# Patient Record
Sex: FEMALE | Race: WHITE | NOT HISPANIC OR LATINO
[De-identification: names, ages, dates, MRNs, and addresses within clinical notes are randomized per-mention and may not be internally consistent; named-entity substitution may affect disease eponyms.]

---

## 2020-11-21 ENCOUNTER — TRANSCRIPTION ENCOUNTER (OUTPATIENT)
Age: 35
End: 2020-11-21

## 2020-11-22 ENCOUNTER — TRANSCRIPTION ENCOUNTER (OUTPATIENT)
Age: 35
End: 2020-11-22

## 2021-02-12 PROBLEM — Z00.00 ENCOUNTER FOR PREVENTIVE HEALTH EXAMINATION: Status: ACTIVE | Noted: 2021-02-12

## 2021-03-11 ENCOUNTER — APPOINTMENT (OUTPATIENT)
Dept: OBGYN | Facility: CLINIC | Age: 36
End: 2021-03-11

## 2021-05-06 ENCOUNTER — NON-APPOINTMENT (OUTPATIENT)
Age: 36
End: 2021-05-06

## 2021-05-06 ENCOUNTER — APPOINTMENT (OUTPATIENT)
Dept: OBGYN | Facility: CLINIC | Age: 36
End: 2021-05-06
Payer: COMMERCIAL

## 2021-05-06 DIAGNOSIS — Z34.82 ENCOUNTER FOR SUPERVISION OF OTHER NORMAL PREGNANCY, SECOND TRIMESTER: ICD-10-CM

## 2021-05-06 DIAGNOSIS — N92.6 IRREGULAR MENSTRUATION, UNSPECIFIED: ICD-10-CM

## 2021-05-06 PROCEDURE — 36415 COLL VENOUS BLD VENIPUNCTURE: CPT

## 2021-05-06 PROCEDURE — 81003 URINALYSIS AUTO W/O SCOPE: CPT | Mod: QW

## 2021-05-06 PROCEDURE — 0500F INITIAL PRENATAL CARE VISIT: CPT

## 2021-05-11 LAB
2ND TRIMESTER DATA: NORMAL
AFP PNL SERPL: NORMAL
AFP SERPL-ACNC: NORMAL
CLINICAL BIOCHEMIST REVIEW: NORMAL
NOTES NTD: NORMAL

## 2021-05-12 ENCOUNTER — ASOB RESULT (OUTPATIENT)
Age: 36
End: 2021-05-12

## 2021-05-12 ENCOUNTER — APPOINTMENT (OUTPATIENT)
Dept: ANTEPARTUM | Facility: CLINIC | Age: 36
End: 2021-05-12
Payer: COMMERCIAL

## 2021-05-12 PROCEDURE — 76817 TRANSVAGINAL US OBSTETRIC: CPT

## 2021-05-12 PROCEDURE — 99072 ADDL SUPL MATRL&STAF TM PHE: CPT

## 2021-05-12 PROCEDURE — 76811 OB US DETAILED SNGL FETUS: CPT

## 2021-05-17 ENCOUNTER — NON-APPOINTMENT (OUTPATIENT)
Age: 36
End: 2021-05-17

## 2021-06-04 ENCOUNTER — NON-APPOINTMENT (OUTPATIENT)
Age: 36
End: 2021-06-04

## 2021-06-04 ENCOUNTER — APPOINTMENT (OUTPATIENT)
Dept: OBGYN | Facility: CLINIC | Age: 36
End: 2021-06-04
Payer: COMMERCIAL

## 2021-06-04 DIAGNOSIS — Z34.82 ENCOUNTER FOR SUPERVISION OF OTHER NORMAL PREGNANCY, SECOND TRIMESTER: ICD-10-CM

## 2021-06-04 DIAGNOSIS — R82.71 BACTERIURIA: ICD-10-CM

## 2021-06-04 PROCEDURE — 0502F SUBSEQUENT PRENATAL CARE: CPT

## 2021-06-04 PROCEDURE — 81003 URINALYSIS AUTO W/O SCOPE: CPT | Mod: QW

## 2021-06-05 ENCOUNTER — TRANSCRIPTION ENCOUNTER (OUTPATIENT)
Age: 36
End: 2021-06-05

## 2021-07-21 ENCOUNTER — APPOINTMENT (OUTPATIENT)
Dept: ANTEPARTUM | Facility: CLINIC | Age: 36
End: 2021-07-21

## 2021-07-27 ENCOUNTER — NON-APPOINTMENT (OUTPATIENT)
Age: 36
End: 2021-07-27

## 2021-07-27 ENCOUNTER — MED ADMIN CHARGE (OUTPATIENT)
Age: 36
End: 2021-07-27

## 2021-07-27 ENCOUNTER — APPOINTMENT (OUTPATIENT)
Dept: OBGYN | Facility: CLINIC | Age: 36
End: 2021-07-27
Payer: COMMERCIAL

## 2021-07-27 DIAGNOSIS — Z23 ENCOUNTER FOR IMMUNIZATION: ICD-10-CM

## 2021-07-27 PROCEDURE — 0502F SUBSEQUENT PRENATAL CARE: CPT

## 2021-07-27 PROCEDURE — 90715 TDAP VACCINE 7 YRS/> IM: CPT

## 2021-07-27 PROCEDURE — 99072 ADDL SUPL MATRL&STAF TM PHE: CPT

## 2021-07-27 PROCEDURE — 90471 IMMUNIZATION ADMIN: CPT

## 2021-08-03 ENCOUNTER — APPOINTMENT (OUTPATIENT)
Dept: ANTEPARTUM | Facility: CLINIC | Age: 36
End: 2021-08-03
Payer: COMMERCIAL

## 2021-08-03 ENCOUNTER — ASOB RESULT (OUTPATIENT)
Age: 36
End: 2021-08-03

## 2021-08-03 PROCEDURE — 76819 FETAL BIOPHYS PROFIL W/O NST: CPT

## 2021-08-03 PROCEDURE — 76816 OB US FOLLOW-UP PER FETUS: CPT

## 2021-08-10 ENCOUNTER — APPOINTMENT (OUTPATIENT)
Dept: OBGYN | Facility: CLINIC | Age: 36
End: 2021-08-10
Payer: COMMERCIAL

## 2021-08-10 DIAGNOSIS — O09.522 SUPERVISION OF ELDERLY MULTIGRAVIDA, SECOND TRIMESTER: ICD-10-CM

## 2021-08-10 PROCEDURE — 0502F SUBSEQUENT PRENATAL CARE: CPT

## 2021-08-24 ENCOUNTER — APPOINTMENT (OUTPATIENT)
Dept: OBGYN | Facility: CLINIC | Age: 36
End: 2021-08-24
Payer: COMMERCIAL

## 2021-08-24 ENCOUNTER — NON-APPOINTMENT (OUTPATIENT)
Age: 36
End: 2021-08-24

## 2021-08-24 VITALS
DIASTOLIC BLOOD PRESSURE: 70 MMHG | HEART RATE: 69 BPM | WEIGHT: 145 LBS | HEIGHT: 71 IN | BODY MASS INDEX: 20.3 KG/M2 | SYSTOLIC BLOOD PRESSURE: 108 MMHG

## 2021-08-24 PROCEDURE — 81003 URINALYSIS AUTO W/O SCOPE: CPT | Mod: QW

## 2021-08-24 PROCEDURE — 0502F SUBSEQUENT PRENATAL CARE: CPT

## 2021-08-31 ENCOUNTER — APPOINTMENT (OUTPATIENT)
Dept: ANTEPARTUM | Facility: CLINIC | Age: 36
End: 2021-08-31
Payer: COMMERCIAL

## 2021-08-31 ENCOUNTER — ASOB RESULT (OUTPATIENT)
Age: 36
End: 2021-08-31

## 2021-08-31 PROCEDURE — 76816 OB US FOLLOW-UP PER FETUS: CPT

## 2021-08-31 PROCEDURE — 76819 FETAL BIOPHYS PROFIL W/O NST: CPT

## 2021-09-01 ENCOUNTER — NON-APPOINTMENT (OUTPATIENT)
Age: 36
End: 2021-09-01

## 2021-09-10 ENCOUNTER — APPOINTMENT (OUTPATIENT)
Dept: OBGYN | Facility: CLINIC | Age: 36
End: 2021-09-10
Payer: COMMERCIAL

## 2021-09-10 DIAGNOSIS — Z34.93 ENCOUNTER FOR SUPERVISION OF NORMAL PREGNANCY, UNSPECIFIED, THIRD TRIMESTER: ICD-10-CM

## 2021-09-10 PROCEDURE — 36415 COLL VENOUS BLD VENIPUNCTURE: CPT

## 2021-09-10 PROCEDURE — 81003 URINALYSIS AUTO W/O SCOPE: CPT | Mod: QW

## 2021-09-10 PROCEDURE — 0502F SUBSEQUENT PRENATAL CARE: CPT

## 2021-09-14 ENCOUNTER — ASOB RESULT (OUTPATIENT)
Age: 36
End: 2021-09-14

## 2021-09-14 ENCOUNTER — APPOINTMENT (OUTPATIENT)
Dept: OBGYN | Facility: CLINIC | Age: 36
End: 2021-09-14
Payer: COMMERCIAL

## 2021-09-14 ENCOUNTER — APPOINTMENT (OUTPATIENT)
Dept: ANTEPARTUM | Facility: CLINIC | Age: 36
End: 2021-09-14
Payer: COMMERCIAL

## 2021-09-14 DIAGNOSIS — Z34.93 ENCOUNTER FOR SUPERVISION OF NORMAL PREGNANCY, UNSPECIFIED, THIRD TRIMESTER: ICD-10-CM

## 2021-09-14 LAB
ABO + RH PNL BLD: NORMAL
B-HEM STREP SPEC QL CULT: ABNORMAL
HBV SURFACE AB SER QL: REACTIVE
HIV1+2 AB SPEC QL IA.RAPID: NONREACTIVE
T PALLIDUM AB SER QL IA: NEGATIVE

## 2021-09-14 PROCEDURE — 81003 URINALYSIS AUTO W/O SCOPE: CPT | Mod: QW

## 2021-09-14 PROCEDURE — 0502F SUBSEQUENT PRENATAL CARE: CPT

## 2021-09-14 PROCEDURE — 76816 OB US FOLLOW-UP PER FETUS: CPT

## 2021-09-14 PROCEDURE — 76819 FETAL BIOPHYS PROFIL W/O NST: CPT

## 2021-09-15 ENCOUNTER — OUTPATIENT (OUTPATIENT)
Dept: INPATIENT UNIT | Facility: HOSPITAL | Age: 36
LOS: 1 days | End: 2021-09-15

## 2021-09-15 ENCOUNTER — NON-APPOINTMENT (OUTPATIENT)
Age: 36
End: 2021-09-15

## 2021-09-16 ENCOUNTER — TRANSCRIPTION ENCOUNTER (OUTPATIENT)
Age: 36
End: 2021-09-16

## 2021-09-16 LAB
C TRACH RRNA SPEC QL NAA+PROBE: NOT DETECTED
N GONORRHOEA RRNA SPEC QL NAA+PROBE: NOT DETECTED
SOURCE AMPLIFICATION: NORMAL

## 2021-09-17 ENCOUNTER — NON-APPOINTMENT (OUTPATIENT)
Age: 36
End: 2021-09-17

## 2021-09-18 ENCOUNTER — OUTPATIENT (OUTPATIENT)
Dept: OUTPATIENT SERVICES | Facility: HOSPITAL | Age: 36
LOS: 1 days | End: 2021-09-18
Payer: COMMERCIAL

## 2021-09-18 DIAGNOSIS — Z3A.00 WEEKS OF GESTATION OF PREGNANCY NOT SPECIFIED: ICD-10-CM

## 2021-09-18 DIAGNOSIS — O26.899 OTHER SPECIFIED PREGNANCY RELATED CONDITIONS, UNSPECIFIED TRIMESTER: ICD-10-CM

## 2021-09-18 PROCEDURE — 99214 OFFICE O/P EST MOD 30 MIN: CPT

## 2021-09-19 ENCOUNTER — INPATIENT (INPATIENT)
Facility: HOSPITAL | Age: 36
LOS: 0 days | Discharge: HOME | End: 2021-09-20
Attending: OBSTETRICS & GYNECOLOGY | Admitting: OBSTETRICS & GYNECOLOGY
Payer: COMMERCIAL

## 2021-09-19 VITALS — WEIGHT: 147.93 LBS | HEIGHT: 71 IN

## 2021-09-19 DIAGNOSIS — Z3A.00 WEEKS OF GESTATION OF PREGNANCY NOT SPECIFIED: ICD-10-CM

## 2021-09-19 DIAGNOSIS — O26.899 OTHER SPECIFIED PREGNANCY RELATED CONDITIONS, UNSPECIFIED TRIMESTER: ICD-10-CM

## 2021-09-19 LAB
BASOPHILS # BLD AUTO: 0.02 K/UL — SIGNIFICANT CHANGE UP (ref 0–0.2)
BASOPHILS NFR BLD AUTO: 0.2 % — SIGNIFICANT CHANGE UP (ref 0–2)
BLD GP AB SCN SERPL QL: NEGATIVE — SIGNIFICANT CHANGE UP
COVID-19 SPIKE DOMAIN AB INTERP: POSITIVE
COVID-19 SPIKE DOMAIN ANTIBODY RESULT: >250 U/ML — HIGH
EOSINOPHIL # BLD AUTO: 0.04 K/UL — SIGNIFICANT CHANGE UP (ref 0–0.5)
EOSINOPHIL NFR BLD AUTO: 0.3 % — SIGNIFICANT CHANGE UP (ref 0–6)
HBV SURFACE AG SER-ACNC: SIGNIFICANT CHANGE UP
HCT VFR BLD CALC: 40.3 % — SIGNIFICANT CHANGE UP (ref 34.5–45)
HGB BLD-MCNC: 13.5 G/DL — SIGNIFICANT CHANGE UP (ref 11.5–15.5)
IMM GRANULOCYTES NFR BLD AUTO: 0.5 % — SIGNIFICANT CHANGE UP (ref 0–1.5)
LYMPHOCYTES # BLD AUTO: 1.81 K/UL — SIGNIFICANT CHANGE UP (ref 1–3.3)
LYMPHOCYTES # BLD AUTO: 15.3 % — SIGNIFICANT CHANGE UP (ref 13–44)
MCHC RBC-ENTMCNC: 31.7 PG — SIGNIFICANT CHANGE UP (ref 27–34)
MCHC RBC-ENTMCNC: 33.5 GM/DL — SIGNIFICANT CHANGE UP (ref 32–36)
MCV RBC AUTO: 94.6 FL — SIGNIFICANT CHANGE UP (ref 80–100)
MONOCYTES # BLD AUTO: 0.65 K/UL — SIGNIFICANT CHANGE UP (ref 0–0.9)
MONOCYTES NFR BLD AUTO: 5.5 % — SIGNIFICANT CHANGE UP (ref 2–14)
NEUTROPHILS # BLD AUTO: 9.27 K/UL — HIGH (ref 1.8–7.4)
NEUTROPHILS NFR BLD AUTO: 78.2 % — HIGH (ref 43–77)
NRBC # BLD: 0 /100 WBCS — SIGNIFICANT CHANGE UP (ref 0–0)
PLATELET # BLD AUTO: 236 K/UL — SIGNIFICANT CHANGE UP (ref 150–400)
RBC # BLD: 4.26 M/UL — SIGNIFICANT CHANGE UP (ref 3.8–5.2)
RBC # FLD: 12.6 % — SIGNIFICANT CHANGE UP (ref 10.3–14.5)
RH IG SCN BLD-IMP: POSITIVE — SIGNIFICANT CHANGE UP
RH IG SCN BLD-IMP: POSITIVE — SIGNIFICANT CHANGE UP
SARS-COV-2 IGG+IGM SERPL QL IA: >250 U/ML — HIGH
SARS-COV-2 IGG+IGM SERPL QL IA: POSITIVE
SARS-COV-2 RNA SPEC QL NAA+PROBE: SIGNIFICANT CHANGE UP
WBC # BLD: 11.85 K/UL — HIGH (ref 3.8–10.5)
WBC # FLD AUTO: 11.85 K/UL — HIGH (ref 3.8–10.5)

## 2021-09-19 PROCEDURE — 59400 OBSTETRICAL CARE: CPT

## 2021-09-19 RX ORDER — DIPHENHYDRAMINE HCL 50 MG
25 CAPSULE ORAL EVERY 6 HOURS
Refills: 0 | Status: DISCONTINUED | OUTPATIENT
Start: 2021-09-19 | End: 2021-09-20

## 2021-09-19 RX ORDER — AMPICILLIN TRIHYDRATE 250 MG
2 CAPSULE ORAL ONCE
Refills: 0 | Status: COMPLETED | OUTPATIENT
Start: 2021-09-19 | End: 2021-09-19

## 2021-09-19 RX ORDER — KETOROLAC TROMETHAMINE 30 MG/ML
30 SYRINGE (ML) INJECTION ONCE
Refills: 0 | Status: DISCONTINUED | OUTPATIENT
Start: 2021-09-19 | End: 2021-09-19

## 2021-09-19 RX ORDER — OXYTOCIN 10 UNIT/ML
333.33 VIAL (ML) INJECTION
Qty: 20 | Refills: 0 | Status: DISCONTINUED | OUTPATIENT
Start: 2021-09-19 | End: 2021-09-19

## 2021-09-19 RX ORDER — AER TRAVELER 0.5 G/1
1 SOLUTION RECTAL; TOPICAL EVERY 4 HOURS
Refills: 0 | Status: DISCONTINUED | OUTPATIENT
Start: 2021-09-19 | End: 2021-09-20

## 2021-09-19 RX ORDER — IBUPROFEN 200 MG
600 TABLET ORAL EVERY 6 HOURS
Refills: 0 | Status: DISCONTINUED | OUTPATIENT
Start: 2021-09-19 | End: 2021-09-20

## 2021-09-19 RX ORDER — HYDROCORTISONE 1 %
1 OINTMENT (GRAM) TOPICAL EVERY 6 HOURS
Refills: 0 | Status: DISCONTINUED | OUTPATIENT
Start: 2021-09-19 | End: 2021-09-20

## 2021-09-19 RX ORDER — IBUPROFEN 200 MG
600 TABLET ORAL EVERY 6 HOURS
Refills: 0 | Status: COMPLETED | OUTPATIENT
Start: 2021-09-19 | End: 2022-08-18

## 2021-09-19 RX ORDER — CITRIC ACID/SODIUM CITRATE 300-500 MG
15 SOLUTION, ORAL ORAL EVERY 6 HOURS
Refills: 0 | Status: DISCONTINUED | OUTPATIENT
Start: 2021-09-19 | End: 2021-09-19

## 2021-09-19 RX ORDER — SIMETHICONE 80 MG/1
80 TABLET, CHEWABLE ORAL EVERY 4 HOURS
Refills: 0 | Status: DISCONTINUED | OUTPATIENT
Start: 2021-09-19 | End: 2021-09-20

## 2021-09-19 RX ORDER — TETANUS TOXOID, REDUCED DIPHTHERIA TOXOID AND ACELLULAR PERTUSSIS VACCINE, ADSORBED 5; 2.5; 8; 8; 2.5 [IU]/.5ML; [IU]/.5ML; UG/.5ML; UG/.5ML; UG/.5ML
0.5 SUSPENSION INTRAMUSCULAR ONCE
Refills: 0 | Status: DISCONTINUED | OUTPATIENT
Start: 2021-09-19 | End: 2021-09-20

## 2021-09-19 RX ORDER — ACETAMINOPHEN 500 MG
975 TABLET ORAL
Refills: 0 | Status: DISCONTINUED | OUTPATIENT
Start: 2021-09-19 | End: 2021-09-20

## 2021-09-19 RX ORDER — OXYCODONE HYDROCHLORIDE 5 MG/1
5 TABLET ORAL ONCE
Refills: 0 | Status: DISCONTINUED | OUTPATIENT
Start: 2021-09-19 | End: 2021-09-20

## 2021-09-19 RX ORDER — BENZOCAINE 10 %
1 GEL (GRAM) MUCOUS MEMBRANE EVERY 6 HOURS
Refills: 0 | Status: DISCONTINUED | OUTPATIENT
Start: 2021-09-19 | End: 2021-09-20

## 2021-09-19 RX ORDER — SODIUM CHLORIDE 9 MG/ML
1000 INJECTION, SOLUTION INTRAVENOUS
Refills: 0 | Status: DISCONTINUED | OUTPATIENT
Start: 2021-09-19 | End: 2021-09-19

## 2021-09-19 RX ORDER — DIBUCAINE 1 %
1 OINTMENT (GRAM) RECTAL EVERY 6 HOURS
Refills: 0 | Status: DISCONTINUED | OUTPATIENT
Start: 2021-09-19 | End: 2021-09-20

## 2021-09-19 RX ORDER — MAGNESIUM HYDROXIDE 400 MG/1
30 TABLET, CHEWABLE ORAL
Refills: 0 | Status: DISCONTINUED | OUTPATIENT
Start: 2021-09-19 | End: 2021-09-20

## 2021-09-19 RX ORDER — PRAMOXINE HYDROCHLORIDE 150 MG/15G
1 AEROSOL, FOAM RECTAL EVERY 4 HOURS
Refills: 0 | Status: DISCONTINUED | OUTPATIENT
Start: 2021-09-19 | End: 2021-09-20

## 2021-09-19 RX ORDER — OXYTOCIN 10 UNIT/ML
VIAL (ML) INJECTION
Qty: 30 | Refills: 0 | Status: DISCONTINUED | OUTPATIENT
Start: 2021-09-19 | End: 2021-09-19

## 2021-09-19 RX ORDER — SODIUM CHLORIDE 9 MG/ML
3 INJECTION INTRAMUSCULAR; INTRAVENOUS; SUBCUTANEOUS EVERY 8 HOURS
Refills: 0 | Status: DISCONTINUED | OUTPATIENT
Start: 2021-09-19 | End: 2021-09-20

## 2021-09-19 RX ORDER — FENTANYL/BUPIVACAINE/NS/PF 2MCG/ML-.1
250 PLASTIC BAG, INJECTION (ML) INJECTION
Refills: 0 | Status: DISCONTINUED | OUTPATIENT
Start: 2021-09-19 | End: 2021-09-19

## 2021-09-19 RX ORDER — LANOLIN
1 OINTMENT (GRAM) TOPICAL EVERY 6 HOURS
Refills: 0 | Status: DISCONTINUED | OUTPATIENT
Start: 2021-09-19 | End: 2021-09-20

## 2021-09-19 RX ORDER — OXYCODONE HYDROCHLORIDE 5 MG/1
5 TABLET ORAL
Refills: 0 | Status: DISCONTINUED | OUTPATIENT
Start: 2021-09-19 | End: 2021-09-20

## 2021-09-19 RX ORDER — INFLUENZA VIRUS VACCINE 15; 15; 15; 15 UG/.5ML; UG/.5ML; UG/.5ML; UG/.5ML
0.5 SUSPENSION INTRAMUSCULAR ONCE
Refills: 0 | Status: DISCONTINUED | OUTPATIENT
Start: 2021-09-19 | End: 2021-09-19

## 2021-09-19 RX ORDER — OXYTOCIN 10 UNIT/ML
333.33 VIAL (ML) INJECTION
Qty: 20 | Refills: 0 | Status: DISCONTINUED | OUTPATIENT
Start: 2021-09-19 | End: 2021-09-20

## 2021-09-19 RX ORDER — CEFAZOLIN SODIUM 1 G
2000 VIAL (EA) INJECTION EVERY 8 HOURS
Refills: 0 | Status: DISCONTINUED | OUTPATIENT
Start: 2021-09-19 | End: 2021-09-19

## 2021-09-19 RX ORDER — AMPICILLIN TRIHYDRATE 250 MG
1 CAPSULE ORAL EVERY 4 HOURS
Refills: 0 | Status: DISCONTINUED | OUTPATIENT
Start: 2021-09-19 | End: 2021-09-19

## 2021-09-19 RX ADMIN — Medication 1000 MILLIUNIT(S)/MIN: at 13:29

## 2021-09-19 RX ADMIN — Medication 600 MILLIGRAM(S): at 19:10

## 2021-09-19 RX ADMIN — Medication 250 MILLILITER(S): at 06:01

## 2021-09-19 RX ADMIN — Medication 975 MILLIGRAM(S): at 20:24

## 2021-09-19 RX ADMIN — Medication 975 MILLIGRAM(S): at 21:25

## 2021-09-19 RX ADMIN — Medication 600 MILLIGRAM(S): at 23:58

## 2021-09-19 RX ADMIN — Medication 30 MILLIGRAM(S): at 13:30

## 2021-09-19 RX ADMIN — Medication 100 MILLIGRAM(S): at 09:45

## 2021-09-19 RX ADMIN — Medication 600 MILLIGRAM(S): at 20:03

## 2021-09-19 RX ADMIN — SODIUM CHLORIDE 125 MILLILITER(S): 9 INJECTION, SOLUTION INTRAVENOUS at 05:01

## 2021-09-19 RX ADMIN — Medication 2 MILLIUNIT(S)/MIN: at 09:18

## 2021-09-19 RX ADMIN — Medication 216 GRAM(S): at 05:45

## 2021-09-19 NOTE — PATIENT PROFILE OB - TEACHING/LEARNING FACTORS IMPACT ABILITY TO LEARN
[Resistant to using DME in place] : resistant to using DME in place [3 or More Times Per Week] : exercises 3 or more times per week [Walking] : walking [PPS Score: ____] : Palliative Performance Scale (PPS) Score: [unfilled] [FAST Score: ____] : Functional Assessment Scale (FAST) Score: [unfilled] none

## 2021-09-20 ENCOUNTER — TRANSCRIPTION ENCOUNTER (OUTPATIENT)
Age: 36
End: 2021-09-20

## 2021-09-20 ENCOUNTER — APPOINTMENT (OUTPATIENT)
Dept: OBGYN | Facility: CLINIC | Age: 36
End: 2021-09-20

## 2021-09-20 VITALS
HEART RATE: 53 BPM | TEMPERATURE: 98 F | SYSTOLIC BLOOD PRESSURE: 123 MMHG | DIASTOLIC BLOOD PRESSURE: 73 MMHG | RESPIRATION RATE: 18 BRPM | OXYGEN SATURATION: 98 %

## 2021-09-20 LAB
RUBV IGG SER-ACNC: 1.1 INDEX — SIGNIFICANT CHANGE UP
RUBV IGG SER-IMP: POSITIVE — SIGNIFICANT CHANGE UP
T PALLIDUM AB TITR SER: NEGATIVE — SIGNIFICANT CHANGE UP

## 2021-09-20 PROCEDURE — 36415 COLL VENOUS BLD VENIPUNCTURE: CPT

## 2021-09-20 PROCEDURE — 86850 RBC ANTIBODY SCREEN: CPT

## 2021-09-20 PROCEDURE — 86901 BLOOD TYPING SEROLOGIC RH(D): CPT

## 2021-09-20 PROCEDURE — 85025 COMPLETE CBC W/AUTO DIFF WBC: CPT

## 2021-09-20 PROCEDURE — 87635 SARS-COV-2 COVID-19 AMP PRB: CPT

## 2021-09-20 PROCEDURE — 87340 HEPATITIS B SURFACE AG IA: CPT

## 2021-09-20 PROCEDURE — 86900 BLOOD TYPING SEROLOGIC ABO: CPT

## 2021-09-20 PROCEDURE — 99214 OFFICE O/P EST MOD 30 MIN: CPT

## 2021-09-20 PROCEDURE — 86762 RUBELLA ANTIBODY: CPT

## 2021-09-20 PROCEDURE — 86769 SARS-COV-2 COVID-19 ANTIBODY: CPT

## 2021-09-20 PROCEDURE — 86780 TREPONEMA PALLIDUM: CPT

## 2021-09-20 PROCEDURE — 59050 FETAL MONITOR W/REPORT: CPT

## 2021-09-20 PROCEDURE — 59025 FETAL NON-STRESS TEST: CPT

## 2021-09-20 RX ORDER — ACETAMINOPHEN 500 MG
2 TABLET ORAL
Qty: 56 | Refills: 0
Start: 2021-09-20 | End: 2021-09-26

## 2021-09-20 RX ORDER — IBUPROFEN 200 MG
1 TABLET ORAL
Qty: 28 | Refills: 0
Start: 2021-09-20 | End: 2021-09-26

## 2021-09-20 RX ORDER — IBUPROFEN 200 MG
1 TABLET ORAL
Qty: 0 | Refills: 0 | DISCHARGE
Start: 2021-09-20

## 2021-09-20 RX ORDER — ACETAMINOPHEN 500 MG
3 TABLET ORAL
Qty: 0 | Refills: 0 | DISCHARGE
Start: 2021-09-20

## 2021-09-20 RX ADMIN — Medication 600 MILLIGRAM(S): at 11:44

## 2021-09-20 RX ADMIN — Medication 975 MILLIGRAM(S): at 17:03

## 2021-09-20 RX ADMIN — Medication 600 MILLIGRAM(S): at 00:55

## 2021-09-20 RX ADMIN — Medication 975 MILLIGRAM(S): at 10:05

## 2021-09-20 RX ADMIN — Medication 975 MILLIGRAM(S): at 02:53

## 2021-09-20 RX ADMIN — SODIUM CHLORIDE 3 MILLILITER(S): 9 INJECTION INTRAMUSCULAR; INTRAVENOUS; SUBCUTANEOUS at 06:54

## 2021-09-20 RX ADMIN — Medication 975 MILLIGRAM(S): at 03:50

## 2021-09-20 RX ADMIN — Medication 975 MILLIGRAM(S): at 09:35

## 2021-09-20 RX ADMIN — Medication 600 MILLIGRAM(S): at 18:44

## 2021-09-20 RX ADMIN — Medication 600 MILLIGRAM(S): at 19:15

## 2021-09-20 RX ADMIN — Medication 600 MILLIGRAM(S): at 12:44

## 2021-09-20 RX ADMIN — Medication 975 MILLIGRAM(S): at 17:33

## 2021-09-20 RX ADMIN — Medication 600 MILLIGRAM(S): at 06:51

## 2021-09-20 RX ADMIN — SODIUM CHLORIDE 3 MILLILITER(S): 9 INJECTION INTRAMUSCULAR; INTRAVENOUS; SUBCUTANEOUS at 00:46

## 2021-09-20 RX ADMIN — Medication 1 TABLET(S): at 11:39

## 2021-09-20 RX ADMIN — SODIUM CHLORIDE 3 MILLILITER(S): 9 INJECTION INTRAMUSCULAR; INTRAVENOUS; SUBCUTANEOUS at 11:42

## 2021-09-20 NOTE — LACTATION INITIAL EVALUATION - NS LACT CON REASON FOR REQ
Met with dyad at about 13 hours of life s/p  @ 38.6 wks gestation. Pt is now P2 and stated that she  her [age] year old for one year. She denied any significant medical history. RN had assisted pt with latching infant prior to this visit. Infant was observed to have a deep latch via the cradle hold and eventually developed and organized, rhythmic suck. Pt denied pain when infant had a deep latch. Per pt and RN pt had expressible colostrum bilaterally and pt stated she was comfortable with hand expression. Reviewed breastfeeding education including the milk production feedback system, responsive/cue based feedings, infant feeding cues, feeding frequency, benefits of skin to skin, how to determine the infant is transferring enough at the breast and cluster feeding. Referred pt to the infant feeding menu at the bedside and the "Your Guide To Postpartum [facility]" booklet. Answered all questions. Informed about LC availability. Pt and support person verbalized understanding of all information discussed./multiparous mom

## 2021-09-20 NOTE — DISCHARGE NOTE OB - CARE PLAN
1 Principal Discharge DX:	Vaginal delivery  Assessment and plan of treatment:	Vaginal delivery, meeting all postpartum milestones.  Follow up in 6 weeks.

## 2021-09-20 NOTE — DISCHARGE NOTE OB - CARE PROVIDER_API CALL
Snehal Butler  Obstetrics and Gynecology  [address] [address]  Phone: [phone]  Fax: [phone]  Follow Up Time:

## 2021-09-20 NOTE — PROGRESS NOTE ADULT - SUBJECTIVE AND OBJECTIVE BOX
Patient evaluated at bedside this morning, resting comfortable in bed, no acute events overnight.  She reports pain is well controlled with tylenol and motrin  She denies headache, dizziness, chest pain, palpitations, shortness of breath, nausea, vomiting, heavy vaginal bleeding or perineal discomfort. Reports decrease in amount of vaginal bleeding and denies clots.  She has been ambulating without assistance, voiding spontaneously, and is breastfeeding.   Tolerating food well, without nausea/vomit.  Passing flatus.     Physical Exam:  T(C): 36.8 (09-20-21 @ 06:00), Max: 36.8 (09-20-21 @ 06:00)  HR: 53 (09-20-21 @ 06:00) (53 - 64)  BP: 96/61 (09-20-21 @ 06:00) (96/60 - 96/61)  RR: 18 (09-20-21 @ 06:00) (18 - 18)  SpO2: 96% (09-20-21 @ 06:00) (96% - 99%)    GA: NAD, A&O x 3  Pulm: normal respiratory rate  Abd: + BS, soft, nontender, nondistended, no rebound or guarding, uterus firm at midline and 2  fb below umbilicus  Extremities: no swelling or calf tenderness                          13.5   11.85 )-----------( 236      ( 19 Sep 2021 05:35 )             40.3           acetaminophen   Tablet .. 975 milliGRAM(s) Oral <User Schedule>  benzocaine 20%/menthol 0.5% Spray 1 Spray(s) Topical every 6 hours PRN  dibucaine 1% Ointment 1 Application(s) Topical every 6 hours PRN  diphenhydrAMINE 25 milliGRAM(s) Oral every 6 hours PRN  diphtheria/tetanus/pertussis (acellular) Vaccine (ADAcel) 0.5 milliLiter(s) IntraMuscular once  hydrocortisone 1% Cream 1 Application(s) Topical every 6 hours PRN  ibuprofen  Tablet. 600 milliGRAM(s) Oral every 6 hours  lanolin Ointment 1 Application(s) Topical every 6 hours PRN  magnesium hydroxide Suspension 30 milliLiter(s) Oral two times a day PRN  oxyCODONE    IR 5 milliGRAM(s) Oral every 3 hours PRN  oxyCODONE    IR 5 milliGRAM(s) Oral once PRN  oxytocin Infusion 333.333 milliUNIT(s)/Min IV Continuous <Continuous>  pramoxine 1%/zinc 5% Cream 1 Application(s) Topical every 4 hours PRN  prenatal multivitamin 1 Tablet(s) Oral daily  simethicone 80 milliGRAM(s) Chew every 4 hours PRN  sodium chloride 0.9% lock flush 3 milliLiter(s) IV Push every 8 hours  witch hazel Pads 1 Application(s) Topical every 4 hours PRN

## 2021-09-20 NOTE — DISCHARGE NOTE OB - PATIENT PORTAL LINK FT
You can access the FollowMyHealth Patient Portal offered by [facility] by registering at the following website: [email or web address]. By joining [facility]’s FollowMyHealth portal, you will also be able to view your health information using other applications (apps) compatible with our system.

## 2021-09-20 NOTE — PROGRESS NOTE ADULT - ASSESSMENT
A/P 36y s/p , PPD #1  , stable, meeting postpartum milestones   - Pain: well controlled on oral pain meds  - GI: Tolerating regular diet  - : urinating without difficulty/pain  -DVT prophylaxis: ambulating frequently  -Dispo: PPD 2, unless otherwise specified

## 2021-09-23 DIAGNOSIS — Z28.21 IMMUNIZATION NOT CARRIED OUT BECAUSE OF PATIENT REFUSAL: ICD-10-CM

## 2021-09-23 DIAGNOSIS — Z88.1 ALLERGY STATUS TO OTHER ANTIBIOTIC AGENTS STATUS: ICD-10-CM

## 2021-09-23 DIAGNOSIS — Z34.80 ENCOUNTER FOR SUPERVISION OF OTHER NORMAL PREGNANCY, UNSPECIFIED TRIMESTER: ICD-10-CM

## 2021-09-23 DIAGNOSIS — Z3A.38 38 WEEKS GESTATION OF PREGNANCY: ICD-10-CM

## 2021-11-02 ENCOUNTER — APPOINTMENT (OUTPATIENT)
Dept: OBGYN | Facility: CLINIC | Age: 36
End: 2021-11-02
Payer: COMMERCIAL

## 2021-11-02 VITALS
WEIGHT: 140 LBS | HEART RATE: 65 BPM | BODY MASS INDEX: 19.6 KG/M2 | SYSTOLIC BLOOD PRESSURE: 106 MMHG | HEIGHT: 71 IN | DIASTOLIC BLOOD PRESSURE: 70 MMHG

## 2021-11-02 PROCEDURE — 0503F POSTPARTUM CARE VISIT: CPT

## 2022-03-15 NOTE — LACTATION INITIAL EVALUATION - HYPOTHYROID
[FreeTextEntry1] : DOING  WELL   NORMAL EXAM MOST  LIKELY   ALLERGY   OBSERVATION  CALL ME  IF ANY  CHANGES
no

## 2022-10-04 ENCOUNTER — APPOINTMENT (OUTPATIENT)
Dept: OBGYN | Facility: CLINIC | Age: 37
End: 2022-10-04

## 2022-10-04 VITALS
BODY MASS INDEX: 17.22 KG/M2 | DIASTOLIC BLOOD PRESSURE: 73 MMHG | WEIGHT: 123 LBS | HEIGHT: 71 IN | SYSTOLIC BLOOD PRESSURE: 111 MMHG

## 2022-10-04 DIAGNOSIS — Z78.9 OTHER SPECIFIED HEALTH STATUS: ICD-10-CM

## 2022-10-04 DIAGNOSIS — N63.20 UNSPECIFIED LUMP IN THE LEFT BREAST, UNSPECIFIED QUADRANT: ICD-10-CM

## 2022-10-04 PROCEDURE — 99395 PREV VISIT EST AGE 18-39: CPT

## 2022-10-04 NOTE — PLAN
[FreeTextEntry1] : annual: pap and hpv\par \par breast f/u sono and us guided bx ordered for nodule as seen in report 2 months ago from [place], thought to be fibroadenoma but d/w pt I would recommend f/u now\par \par declines birth control for now, \par \par reminded to get [facility] md\par \par referrals for anxiety given

## 2022-10-04 NOTE — REVIEW OF SYSTEMS
[Breast Lump] : breast lump [Anxiety] : anxiety [Negative] : Neurological [FreeTextEntry2] : got covid vaccines [de-identified] : had sono in [place], showed nodule and told f/u in 6 mnths, but I sent for f/u now [de-identified] : gave referrals to mental health providers

## 2022-10-04 NOTE — HISTORY OF PRESENT ILLNESS
[N] : Patient does not use contraception [Monogamous (Male Partner)] : is monogamous with a male partner [Y] : Positive pregnancy history [Normal Amount/Duration] :  normal amount and duration [Regular Cycle Intervals] : periods have been regular [Currently Active] : currently active [Men] : men [PGHxTotal] : 2 [[facility] : 2 [[facility]Living] : 2 [FreeTextEntry1] : 9/7/22

## 2022-10-04 NOTE — PHYSICAL EXAM
[Appropriately responsive] : appropriately responsive [Alert] : alert [No Acute Distress] : no acute distress [No Lymphadenopathy] : no lymphadenopathy [Regular Rate Rhythm] : regular rate rhythm [Clear to Auscultation B/L] : clear to auscultation bilaterally [Soft] : soft [Non-tender] : non-tender [Non-distended] : non-distended [No Mass] : no mass [Oriented x3] : oriented x3 [FreeTextEntry6] : found to have left 1 o ck nodule up to 13 mm [Examination Of The Breasts] : a normal appearance [Labia Majora] : normal [Labia Minora] : normal [Normal] : normal [Normal Position] : in a normal position [Uterine Adnexae] : normal

## 2022-10-06 LAB — HPV HIGH+LOW RISK DNA PNL CVX: NOT DETECTED

## 2022-10-12 ENCOUNTER — RESULT REVIEW (OUTPATIENT)
Age: 37
End: 2022-10-12

## 2022-10-12 ENCOUNTER — OUTPATIENT (OUTPATIENT)
Dept: OUTPATIENT SERVICES | Facility: HOSPITAL | Age: 37
LOS: 1 days | End: 2022-10-12

## 2022-10-12 ENCOUNTER — APPOINTMENT (OUTPATIENT)
Dept: ULTRASOUND IMAGING | Facility: CLINIC | Age: 37
End: 2022-10-12

## 2022-10-12 ENCOUNTER — APPOINTMENT (OUTPATIENT)
Dept: MAMMOGRAPHY | Facility: CLINIC | Age: 37
End: 2022-10-12

## 2022-10-12 PROCEDURE — G0279: CPT | Mod: 26

## 2022-10-12 PROCEDURE — 76641 ULTRASOUND BREAST COMPLETE: CPT | Mod: 26,50

## 2022-10-12 PROCEDURE — 77066 DX MAMMO INCL CAD BI: CPT | Mod: 26

## 2022-10-14 ENCOUNTER — TRANSCRIPTION ENCOUNTER (OUTPATIENT)
Age: 37
End: 2022-10-14

## 2022-10-14 LAB — CYTOLOGY CVX/VAG DOC THIN PREP: NORMAL

## 2023-02-22 NOTE — DISCHARGE NOTE OB - PLAN OF CARE
PADMINI asked for a Preethi 3 to be sent to her pharmacy.
Vaginal delivery, meeting all postpartum milestones.  Follow up in 6 weeks.

## 2023-10-05 ENCOUNTER — APPOINTMENT (OUTPATIENT)
Dept: OBGYN | Facility: CLINIC | Age: 38
End: 2023-10-05
Payer: COMMERCIAL

## 2023-10-05 VITALS
BODY MASS INDEX: 17.5 KG/M2 | WEIGHT: 125 LBS | DIASTOLIC BLOOD PRESSURE: 75 MMHG | HEART RATE: 75 BPM | SYSTOLIC BLOOD PRESSURE: 109 MMHG | HEIGHT: 71 IN

## 2023-10-05 DIAGNOSIS — Z87.42 PERSONAL HISTORY OF OTHER DISEASES OF THE FEMALE GENITAL TRACT: ICD-10-CM

## 2023-10-05 PROCEDURE — 99395 PREV VISIT EST AGE 18-39: CPT

## 2023-10-09 LAB — HPV HIGH+LOW RISK DNA PNL CVX: NOT DETECTED

## 2023-10-10 ENCOUNTER — TRANSCRIPTION ENCOUNTER (OUTPATIENT)
Age: 38
End: 2023-10-10

## 2023-10-10 LAB — CYTOLOGY CVX/VAG DOC THIN PREP: NORMAL

## 2024-02-07 ENCOUNTER — APPOINTMENT (OUTPATIENT)
Dept: OBGYN | Facility: CLINIC | Age: 39
End: 2024-02-07
Payer: COMMERCIAL

## 2024-02-07 ENCOUNTER — RESULT REVIEW (OUTPATIENT)
Age: 39
End: 2024-02-07

## 2024-02-07 VITALS
HEIGHT: 71 IN | SYSTOLIC BLOOD PRESSURE: 107 MMHG | OXYGEN SATURATION: 100 % | DIASTOLIC BLOOD PRESSURE: 73 MMHG | BODY MASS INDEX: 17.5 KG/M2 | WEIGHT: 125 LBS | HEART RATE: 68 BPM

## 2024-02-07 DIAGNOSIS — Z31.41 ENCOUNTER FOR FERTILITY TESTING: ICD-10-CM

## 2024-02-07 DIAGNOSIS — D24.2 BENIGN NEOPLASM OF LEFT BREAST: ICD-10-CM

## 2024-02-07 DIAGNOSIS — Z11.3 ENCOUNTER FOR SCREENING FOR INFECTIONS WITH A PREDOMINANTLY SEXUAL MODE OF TRANSMISSION: ICD-10-CM

## 2024-02-07 DIAGNOSIS — Z01.419 ENCOUNTER FOR GYNECOLOGICAL EXAMINATION (GENERAL) (ROUTINE) W/OUT ABNORMAL FINDINGS: ICD-10-CM

## 2024-02-07 DIAGNOSIS — N60.11 DIFFUSE CYSTIC MASTOPATHY OF RIGHT BREAST: ICD-10-CM

## 2024-02-07 PROCEDURE — 36415 COLL VENOUS BLD VENIPUNCTURE: CPT

## 2024-02-07 PROCEDURE — 99395 PREV VISIT EST AGE 18-39: CPT

## 2024-02-07 NOTE — HISTORY OF PRESENT ILLNESS
[FreeTextEntry1] : 39 yo present for problem visit.  Pt stated that she had pelvic u/s in August 2023 that had impression of 6mm hypoechoic nodule in the mis-endometrium. Pt is also c/o Lt  painful breast lump and requesting breast exam.

## 2024-02-07 NOTE — PROCEDURE
No [Cervical Pap Smear] : cervical Pap smear [Liquid Base] : liquid base [GC & Chlamydia via Pap] : GC & Chlamydia via Pap [Tolerated Well] : the patient tolerated the procedure well [No Complications] : there were no complications

## 2024-02-07 NOTE — COUNSELING
[Breast Self Exam] : breast self exam [Contraception/ Emergency Contraception/ Safe Sexual Practices] : contraception, emergency contraception, safe sexual practices [Preconception Care/ Fertility options] : preconception care, fertility options [STD (testing, results, tx)] : STD (testing, results, tx) [Lab Results] : lab results

## 2024-02-07 NOTE — PLAN
[FreeTextEntry1] : annual : (new insurance so doing it early) pap and sti tests  *6 mm nodule in em incidental finding pelvic sono: advised to get sonohysterogram for further information  breast anxiety: known left fibroadenoma also right dense tissue/ pain, diagnostic mammogram and sono refer to br surgeon  may consider oocyte freezing: info given re rma, also d/w pt [facility] and this done

## 2024-02-08 LAB
ANTI-MUELLERIAN HORMONE: 1.31 NG/ML
C TRACH RRNA SPEC QL NAA+PROBE: NOT DETECTED
HIV1+2 AB SPEC QL IA.RAPID: NONREACTIVE
HPV HIGH+LOW RISK DNA PNL CVX: NOT DETECTED
N GONORRHOEA RRNA SPEC QL NAA+PROBE: NOT DETECTED
SOURCE TP AMPLIFICATION: NORMAL
T PALLIDUM AB SER QL IA: NEGATIVE

## 2024-02-09 LAB
HBV SURFACE AG SER QL: NONREACTIVE
HCV AB SER QL: NONREACTIVE
HCV S/CO RATIO: 0.11 S/CO

## 2024-02-13 ENCOUNTER — TRANSCRIPTION ENCOUNTER (OUTPATIENT)
Age: 39
End: 2024-02-13

## 2024-02-13 LAB — CYTOLOGY CVX/VAG DOC THIN PREP: NORMAL

## 2024-03-14 ENCOUNTER — APPOINTMENT (OUTPATIENT)
Dept: ULTRASOUND IMAGING | Facility: CLINIC | Age: 39
End: 2024-03-14
Payer: COMMERCIAL

## 2024-03-14 ENCOUNTER — RESULT REVIEW (OUTPATIENT)
Age: 39
End: 2024-03-14

## 2024-03-14 ENCOUNTER — TRANSCRIPTION ENCOUNTER (OUTPATIENT)
Age: 39
End: 2024-03-14

## 2024-03-14 ENCOUNTER — OUTPATIENT (OUTPATIENT)
Dept: OUTPATIENT SERVICES | Facility: HOSPITAL | Age: 39
LOS: 1 days | End: 2024-03-14

## 2024-03-14 ENCOUNTER — APPOINTMENT (OUTPATIENT)
Dept: MAMMOGRAPHY | Facility: CLINIC | Age: 39
End: 2024-03-14
Payer: COMMERCIAL

## 2024-03-14 PROCEDURE — 76641 ULTRASOUND BREAST COMPLETE: CPT | Mod: 26,50

## 2024-03-14 PROCEDURE — 77066 DX MAMMO INCL CAD BI: CPT | Mod: 26

## 2024-03-14 PROCEDURE — G0279: CPT | Mod: 26

## 2024-04-16 ENCOUNTER — APPOINTMENT (OUTPATIENT)
Dept: ANTEPARTUM | Facility: CLINIC | Age: 39
End: 2024-04-16
Payer: COMMERCIAL

## 2024-04-16 ENCOUNTER — ASOB RESULT (OUTPATIENT)
Age: 39
End: 2024-04-16

## 2024-04-16 PROCEDURE — 58340 CATHETER FOR HYSTEROGRAPHY: CPT

## 2024-04-16 PROCEDURE — 76830 TRANSVAGINAL US NON-OB: CPT | Mod: 59

## 2024-04-16 PROCEDURE — 76831 ECHO EXAM UTERUS: CPT

## 2024-04-16 PROCEDURE — 76376 3D RENDER W/INTRP POSTPROCES: CPT

## 2025-02-13 ENCOUNTER — APPOINTMENT (OUTPATIENT)
Dept: OBGYN | Facility: CLINIC | Age: 40
End: 2025-02-13
Payer: COMMERCIAL

## 2025-02-13 VITALS
WEIGHT: 123 LBS | OXYGEN SATURATION: 99 % | HEART RATE: 67 BPM | BODY MASS INDEX: 17.22 KG/M2 | HEIGHT: 71 IN | DIASTOLIC BLOOD PRESSURE: 78 MMHG | SYSTOLIC BLOOD PRESSURE: 114 MMHG

## 2025-02-13 DIAGNOSIS — Z01.419 ENCOUNTER FOR GYNECOLOGICAL EXAMINATION (GENERAL) (ROUTINE) W/OUT ABNORMAL FINDINGS: ICD-10-CM

## 2025-02-13 DIAGNOSIS — Z11.3 ENCOUNTER FOR SCREENING FOR INFECTIONS WITH A PREDOMINANTLY SEXUAL MODE OF TRANSMISSION: ICD-10-CM

## 2025-02-13 DIAGNOSIS — R92.30 DENSE BREASTS, UNSPECIFIED: ICD-10-CM

## 2025-02-13 DIAGNOSIS — Z87.898 PERSONAL HISTORY OF OTHER SPECIFIED CONDITIONS: ICD-10-CM

## 2025-02-13 DIAGNOSIS — N63.10 UNSPECIFIED LUMP IN THE RIGHT BREAST, UNSPECIFIED QUADRANT: ICD-10-CM

## 2025-02-13 DIAGNOSIS — N63.20 UNSPECIFIED LUMP IN THE LEFT BREAST, UNSPECIFIED QUADRANT: ICD-10-CM

## 2025-02-13 PROCEDURE — 99395 PREV VISIT EST AGE 18-39: CPT

## 2025-02-13 PROCEDURE — 99459 PELVIC EXAMINATION: CPT

## 2025-02-14 LAB
C TRACH RRNA SPEC QL NAA+PROBE: NOT DETECTED
HPV HIGH+LOW RISK DNA PNL CVX: NOT DETECTED
N GONORRHOEA RRNA SPEC QL NAA+PROBE: NOT DETECTED
SOURCE TP AMPLIFICATION: NORMAL
T PALLIDUM AB SER QL IA: NEGATIVE

## 2025-02-18 LAB
HBV SURFACE AG SER QL: NONREACTIVE
HCV AB SER QL: NONREACTIVE
HCV S/CO RATIO: 0.11 S/CO
HIV1+2 AB SPEC QL IA.RAPID: NONREACTIVE

## 2025-02-19 ENCOUNTER — TRANSCRIPTION ENCOUNTER (OUTPATIENT)
Age: 40
End: 2025-02-19

## 2025-02-19 LAB — CYTOLOGY CVX/VAG DOC THIN PREP: NORMAL

## 2025-02-20 ENCOUNTER — NON-APPOINTMENT (OUTPATIENT)
Age: 40
End: 2025-02-20

## 2025-02-21 ENCOUNTER — APPOINTMENT (OUTPATIENT)
Dept: MAMMOGRAPHY | Facility: CLINIC | Age: 40
End: 2025-02-21
Payer: COMMERCIAL

## 2025-02-21 ENCOUNTER — APPOINTMENT (OUTPATIENT)
Dept: ULTRASOUND IMAGING | Facility: CLINIC | Age: 40
End: 2025-02-21
Payer: COMMERCIAL

## 2025-02-21 ENCOUNTER — TRANSCRIPTION ENCOUNTER (OUTPATIENT)
Age: 40
End: 2025-02-21

## 2025-02-21 ENCOUNTER — RESULT REVIEW (OUTPATIENT)
Age: 40
End: 2025-02-21

## 2025-02-21 PROCEDURE — 77066 DX MAMMO INCL CAD BI: CPT

## 2025-02-21 PROCEDURE — G0279: CPT

## 2025-02-21 PROCEDURE — 76641 ULTRASOUND BREAST COMPLETE: CPT | Mod: 50

## 2025-03-11 ENCOUNTER — APPOINTMENT (OUTPATIENT)
Dept: BREAST CENTER | Facility: CLINIC | Age: 40
End: 2025-03-11
Payer: COMMERCIAL

## 2025-03-11 ENCOUNTER — NON-APPOINTMENT (OUTPATIENT)
Age: 40
End: 2025-03-11

## 2025-03-11 VITALS — HEIGHT: 71 IN | WEIGHT: 124 LBS | BODY MASS INDEX: 17.36 KG/M2

## 2025-03-11 DIAGNOSIS — N63.10 UNSPECIFIED LUMP IN THE RIGHT BREAST, UNSPECIFIED QUADRANT: ICD-10-CM

## 2025-03-11 DIAGNOSIS — Z80.7 FAMILY HISTORY OF OTHER MALIGNANT NEOPLASMS OF LYMPHOID, HEMATOPOIETIC AND RELATED TISSUES: ICD-10-CM

## 2025-03-11 DIAGNOSIS — D24.2 BENIGN NEOPLASM OF LEFT BREAST: ICD-10-CM

## 2025-03-11 DIAGNOSIS — Z78.9 OTHER SPECIFIED HEALTH STATUS: ICD-10-CM

## 2025-03-11 DIAGNOSIS — R92.30 DENSE BREASTS, UNSPECIFIED: ICD-10-CM

## 2025-03-11 DIAGNOSIS — Z80.3 FAMILY HISTORY OF MALIGNANT NEOPLASM OF BREAST: ICD-10-CM

## 2025-03-11 DIAGNOSIS — Z12.39 ENCOUNTER FOR OTHER SCREENING FOR MALIGNANT NEOPLASM OF BREAST: ICD-10-CM

## 2025-03-11 PROCEDURE — 99205 OFFICE O/P NEW HI 60 MIN: CPT

## 2025-03-11 RX ORDER — TRETINOIN 1 MG/G
CREAM TOPICAL
Refills: 0 | Status: ACTIVE | COMMUNITY

## 2025-04-04 ENCOUNTER — APPOINTMENT (OUTPATIENT)
Dept: HEMATOLOGY ONCOLOGY | Facility: CLINIC | Age: 40
End: 2025-04-04